# Patient Record
Sex: FEMALE | Race: WHITE | NOT HISPANIC OR LATINO | URBAN - METROPOLITAN AREA
[De-identification: names, ages, dates, MRNs, and addresses within clinical notes are randomized per-mention and may not be internally consistent; named-entity substitution may affect disease eponyms.]

---

## 2023-05-17 ENCOUNTER — APPOINTMENT (RX ONLY)
Dept: URBAN - METROPOLITAN AREA CLINIC 76 | Facility: CLINIC | Age: 58
Setting detail: DERMATOLOGY
End: 2023-05-17

## 2023-05-17 DIAGNOSIS — L81.4 OTHER MELANIN HYPERPIGMENTATION: ICD-10-CM

## 2023-05-17 DIAGNOSIS — Z71.89 OTHER SPECIFIED COUNSELING: ICD-10-CM

## 2023-05-17 DIAGNOSIS — L30.9 DERMATITIS, UNSPECIFIED: ICD-10-CM

## 2023-05-17 DIAGNOSIS — D22 MELANOCYTIC NEVI: ICD-10-CM

## 2023-05-17 DIAGNOSIS — L57.0 ACTINIC KERATOSIS: ICD-10-CM

## 2023-05-17 DIAGNOSIS — D18.0 HEMANGIOMA: ICD-10-CM

## 2023-05-17 DIAGNOSIS — L82.1 OTHER SEBORRHEIC KERATOSIS: ICD-10-CM

## 2023-05-17 PROBLEM — D22.5 MELANOCYTIC NEVI OF TRUNK: Status: ACTIVE | Noted: 2023-05-17

## 2023-05-17 PROBLEM — D18.01 HEMANGIOMA OF SKIN AND SUBCUTANEOUS TISSUE: Status: ACTIVE | Noted: 2023-05-17

## 2023-05-17 PROCEDURE — ? TREATMENT REGIMEN

## 2023-05-17 PROCEDURE — ? SUNSCREEN RECOMMENDATIONS

## 2023-05-17 PROCEDURE — ? PRESCRIPTION

## 2023-05-17 PROCEDURE — 17000 DESTRUCT PREMALG LESION: CPT

## 2023-05-17 PROCEDURE — ? LIQUID NITROGEN

## 2023-05-17 PROCEDURE — 17003 DESTRUCT PREMALG LES 2-14: CPT

## 2023-05-17 PROCEDURE — ? COUNSELING

## 2023-05-17 PROCEDURE — 99203 OFFICE O/P NEW LOW 30 MIN: CPT | Mod: 25

## 2023-05-17 RX ORDER — FLUTICASONE PROPIONATE 0.5 MG/G
CREAM TOPICAL
Qty: 30 | Refills: 0 | Status: ERX | COMMUNITY
Start: 2023-05-17

## 2023-05-17 RX ADMIN — FLUTICASONE PROPIONATE: 0.5 CREAM TOPICAL at 00:00

## 2023-05-17 ASSESSMENT — LOCATION DETAILED DESCRIPTION DERM
LOCATION DETAILED: SUBXIPHOID
LOCATION DETAILED: LEFT FOREHEAD
LOCATION DETAILED: NASAL DORSUM
LOCATION DETAILED: INFERIOR THORACIC SPINE

## 2023-05-17 ASSESSMENT — LOCATION SIMPLE DESCRIPTION DERM
LOCATION SIMPLE: ABDOMEN
LOCATION SIMPLE: UPPER BACK
LOCATION SIMPLE: NOSE
LOCATION SIMPLE: LEFT FOREHEAD

## 2023-05-17 ASSESSMENT — LOCATION ZONE DERM
LOCATION ZONE: TRUNK
LOCATION ZONE: FACE
LOCATION ZONE: NOSE

## 2023-05-17 NOTE — PROCEDURE: TREATMENT REGIMEN
Plan: Advised to keep area as cool and dry as possible
Detail Level: Zone
Initiate Treatment: Fluticasone cream to affected areas twice daily x 1 month

## 2023-05-17 NOTE — HPI: TOTAL BODY SKIN EXAM (PATIENT REPORTED)
Do You Have Any Concerning Skin Lesions Today?  If No, You Do Not Have To Fill Out The Remainder Of The Questions.: yes
Where Is Your Skin Lesion(S) Of Concern Located?: Nose

## 2023-05-17 NOTE — PROCEDURE: LIQUID NITROGEN
Show Applicator Variable?: Yes
Duration Of Freeze Thaw-Cycle (Seconds): 10
Post-Care Instructions: I reviewed with the patient in detail post-care instructions. Patient is to wear sunprotection, and avoid picking at any of the treated lesions. Pt may apply Vaseline to crusted or scabbing areas.
Consent: The patient's consent was obtained including but not limited to risks of crusting, scabbing, blistering, scarring, darker or lighter pigmentary change, recurrence, incomplete removal and infection.
Render Note In Bullet Format When Appropriate: No
Detail Level: Detailed
Number Of Freeze-Thaw Cycles: 2 freeze-thaw cycles

## 2023-09-18 ENCOUNTER — APPOINTMENT (RX ONLY)
Dept: URBAN - METROPOLITAN AREA CLINIC 76 | Facility: CLINIC | Age: 58
Setting detail: DERMATOLOGY
End: 2023-09-18

## 2023-09-18 DIAGNOSIS — L57.0 ACTINIC KERATOSIS: ICD-10-CM

## 2023-09-18 DIAGNOSIS — L30.1 DYSHIDROSIS [POMPHOLYX]: ICD-10-CM

## 2023-09-18 PROCEDURE — ? PRESCRIPTION

## 2023-09-18 PROCEDURE — ? TREATMENT REGIMEN

## 2023-09-18 PROCEDURE — 99213 OFFICE O/P EST LOW 20 MIN: CPT

## 2023-09-18 PROCEDURE — ? COUNSELING

## 2023-09-18 RX ORDER — CLOBETASOL PROPIONATE 0.5 MG/G
CREAM TOPICAL BID
Qty: 30 | Refills: 0 | Status: ERX | COMMUNITY
Start: 2023-09-18

## 2023-09-18 RX ADMIN — CLOBETASOL PROPIONATE: 0.5 CREAM TOPICAL at 00:00

## 2023-09-18 ASSESSMENT — LOCATION ZONE DERM: LOCATION ZONE: HAND

## 2023-09-18 ASSESSMENT — LOCATION SIMPLE DESCRIPTION DERM
LOCATION SIMPLE: LEFT HAND
LOCATION SIMPLE: RIGHT HAND

## 2023-09-18 ASSESSMENT — LOCATION DETAILED DESCRIPTION DERM
LOCATION DETAILED: RIGHT RADIAL PALM
LOCATION DETAILED: LEFT RADIAL PALM

## 2023-09-18 NOTE — PROCEDURE: TREATMENT REGIMEN
Plan: - discussed doing wet dressings at night for 20-30mins\\n- discussed patch testing as an option\\n- if no change at follow up, recommend systemic steroids
Detail Level: Zone
Initiate Treatment: - clobetasol twice daily to affected areas up to month. Once clear taper off use.
Discontinue Regimen: - recommended to stop getting manicures

## 2023-09-18 NOTE — HPI: RASH
What Type Of Note Output Would You Prefer (Optional)?: Standard Output
Is The Patient Presenting As Previously Scheduled?: Yes
How Severe Is Your Rash?: mild
Is This A New Presentation, Or A Follow-Up?: Rash
Additional History: Patient said she went to a walk in clinic and to her PCP. She was told she has eczema and was given TAC. She said it’s not helping.

## 2023-09-29 ENCOUNTER — RX ONLY (OUTPATIENT)
Age: 58
Setting detail: RX ONLY
End: 2023-09-29

## 2023-09-29 RX ORDER — CLOBETASOL PROPIONATE 0.5 MG/G
OINTMENT TOPICAL BID
Qty: 30 | Refills: 0 | Status: ERX | COMMUNITY
Start: 2023-09-29

## 2023-10-17 ENCOUNTER — APPOINTMENT (RX ONLY)
Dept: URBAN - METROPOLITAN AREA CLINIC 76 | Facility: CLINIC | Age: 58
Setting detail: DERMATOLOGY
End: 2023-10-17

## 2023-10-17 DIAGNOSIS — L30.1 DYSHIDROSIS [POMPHOLYX]: ICD-10-CM

## 2023-10-17 PROBLEM — L30.9 DERMATITIS, UNSPECIFIED: Status: ACTIVE | Noted: 2023-10-17

## 2023-10-17 PROCEDURE — ? TREATMENT REGIMEN

## 2023-10-17 PROCEDURE — ? COUNSELING

## 2023-10-17 PROCEDURE — 99213 OFFICE O/P EST LOW 20 MIN: CPT

## 2023-10-17 ASSESSMENT — LOCATION SIMPLE DESCRIPTION DERM
LOCATION SIMPLE: LEFT HAND
LOCATION SIMPLE: RIGHT HAND

## 2023-10-17 ASSESSMENT — LOCATION DETAILED DESCRIPTION DERM
LOCATION DETAILED: RIGHT RADIAL PALM
LOCATION DETAILED: LEFT RADIAL PALM

## 2023-10-17 ASSESSMENT — LOCATION ZONE DERM: LOCATION ZONE: HAND

## 2023-10-17 NOTE — PROCEDURE: TREATMENT REGIMEN
Continue Regimen: clobetasol ointment twice daily to affected areas up to month. Once clear taper off use.
Plan: discussed doing wet dressings at night for 20-30mins\\n\\ndiscussed patch testing as an option, we will submit IOB
Detail Level: Zone

## 2024-04-16 ENCOUNTER — APPOINTMENT (RX ONLY)
Dept: URBAN - METROPOLITAN AREA CLINIC 76 | Facility: CLINIC | Age: 59
Setting detail: DERMATOLOGY
End: 2024-04-16

## 2024-04-16 DIAGNOSIS — L30.1 DYSHIDROSIS [POMPHOLYX]: ICD-10-CM | Status: INADEQUATELY CONTROLLED

## 2024-04-16 PROBLEM — L30.9 DERMATITIS, UNSPECIFIED: Status: ACTIVE | Noted: 2024-04-16

## 2024-04-16 PROCEDURE — 99214 OFFICE O/P EST MOD 30 MIN: CPT

## 2024-04-16 PROCEDURE — ? PRESCRIPTION

## 2024-04-16 PROCEDURE — ? TREATMENT REGIMEN

## 2024-04-16 PROCEDURE — ? COUNSELING

## 2024-04-16 RX ORDER — BETAMETHASONE DIPROPIONATE 0.5 MG/G
CREAM TOPICAL BID
Qty: 45 | Refills: 0 | Status: ERX | COMMUNITY
Start: 2024-04-16

## 2024-04-16 RX ADMIN — BETAMETHASONE DIPROPIONATE: 0.5 CREAM TOPICAL at 00:00

## 2024-04-16 ASSESSMENT — LOCATION DETAILED DESCRIPTION DERM
LOCATION DETAILED: LEFT RADIAL PALM
LOCATION DETAILED: RIGHT RADIAL PALM

## 2024-04-16 ASSESSMENT — LOCATION ZONE DERM: LOCATION ZONE: HAND

## 2024-04-16 ASSESSMENT — LOCATION SIMPLE DESCRIPTION DERM
LOCATION SIMPLE: RIGHT HAND
LOCATION SIMPLE: LEFT HAND

## 2024-04-16 NOTE — PROCEDURE: TREATMENT REGIMEN
Otc Regimen: Hydrocortisone cream twice daily to areas around eyes if flare occurs again \\n\\nAquaphor at night after steroid
Plan: Discussed patch testing, patient will think about it and let our office know if she would like us to submit a new IOB \\n\\npatient states she does have seasonal allergies
Detail Level: Zone
Initiate Treatment: Betamethasone cream to affected areas on hands twice daily until clear, avoid face, armpits or groin
Discontinue Regimen: clobetasol ointment

## 2024-04-16 NOTE — HPI: RASH
What Type Of Note Output Would You Prefer (Optional)?: Standard Output
How Severe Is Your Rash?: mild
Is This A New Presentation, Or A Follow-Up?: Rash
Additional History: Patient states that over the weekend she got some allergy eye drops and rubbed it on her eyes and then it started to get better

## 2024-10-01 ENCOUNTER — RX ONLY (OUTPATIENT)
Age: 59
Setting detail: RX ONLY
End: 2024-10-01

## 2024-10-01 ENCOUNTER — APPOINTMENT (RX ONLY)
Dept: URBAN - METROPOLITAN AREA CLINIC 76 | Facility: CLINIC | Age: 59
Setting detail: DERMATOLOGY
End: 2024-10-01

## 2024-10-01 DIAGNOSIS — L81.4 OTHER MELANIN HYPERPIGMENTATION: ICD-10-CM

## 2024-10-01 DIAGNOSIS — L82.1 OTHER SEBORRHEIC KERATOSIS: ICD-10-CM

## 2024-10-01 DIAGNOSIS — L30.1 DYSHIDROSIS [POMPHOLYX]: ICD-10-CM

## 2024-10-01 DIAGNOSIS — D22 MELANOCYTIC NEVI: ICD-10-CM

## 2024-10-01 DIAGNOSIS — D18.0 HEMANGIOMA: ICD-10-CM

## 2024-10-01 DIAGNOSIS — Z71.89 OTHER SPECIFIED COUNSELING: ICD-10-CM

## 2024-10-01 PROBLEM — L30.9 DERMATITIS, UNSPECIFIED: Status: ACTIVE | Noted: 2024-10-01

## 2024-10-01 PROBLEM — D18.01 HEMANGIOMA OF SKIN AND SUBCUTANEOUS TISSUE: Status: ACTIVE | Noted: 2024-10-01

## 2024-10-01 PROBLEM — D22.5 MELANOCYTIC NEVI OF TRUNK: Status: ACTIVE | Noted: 2024-10-01

## 2024-10-01 PROCEDURE — ? COUNSELING

## 2024-10-01 PROCEDURE — ? SUNSCREEN RECOMMENDATIONS

## 2024-10-01 PROCEDURE — ? PRESCRIPTION

## 2024-10-01 PROCEDURE — 99214 OFFICE O/P EST MOD 30 MIN: CPT

## 2024-10-01 PROCEDURE — ? TREATMENT REGIMEN

## 2024-10-01 RX ORDER — HYDROCORTISONE 25 MG/G
CREAM TOPICAL
Qty: 28 | Refills: 0 | Status: ERX | COMMUNITY
Start: 2024-10-01

## 2024-10-01 RX ORDER — BETAMETHASONE DIPROPIONATE 0.5 MG/G
CREAM TOPICAL BID
Qty: 45 | Refills: 0 | Status: ERX | COMMUNITY
Start: 2024-10-01

## 2024-10-01 RX ADMIN — HYDROCORTISONE: 25 CREAM TOPICAL at 00:00

## 2024-10-01 ASSESSMENT — LOCATION DETAILED DESCRIPTION DERM
LOCATION DETAILED: RIGHT RADIAL PALM
LOCATION DETAILED: LEFT RADIAL PALM
LOCATION DETAILED: RIGHT MEDIAL UPPER BACK
LOCATION DETAILED: INFERIOR THORACIC SPINE
LOCATION DETAILED: RIGHT SUPERIOR UPPER BACK

## 2024-10-01 ASSESSMENT — LOCATION SIMPLE DESCRIPTION DERM
LOCATION SIMPLE: UPPER BACK
LOCATION SIMPLE: RIGHT HAND
LOCATION SIMPLE: RIGHT UPPER BACK
LOCATION SIMPLE: LEFT HAND

## 2024-10-01 ASSESSMENT — LOCATION ZONE DERM
LOCATION ZONE: HAND
LOCATION ZONE: TRUNK

## 2024-10-01 NOTE — PROCEDURE: MIPS QUALITY
Quality 226: Preventive Care And Screening: Tobacco Use: Screening And Cessation Intervention: Patient screened for tobacco use and is an ex/non-smoker
Detail Level: Detailed
Self

## 2024-10-01 NOTE — PROCEDURE: TREATMENT REGIMEN
Otc Regimen: Hydrocortisone cream twice daily to areas around eyes if flare occurs again \\n\\nAquaphor at night after steroid
Continue Regimen: Betamethasone cream to affected areas on hands twice daily until clear, avoid face, armpits or groin
Plan: Discussed patch testing, patient will think about it and let our office know if she would like us to submit a new IOB \\n\\npatient states she does have seasonal allergies
Detail Level: Zone
Initiate Treatment: Hydrocortisone 2.5% cream apply to rash on eyes twice daily x1-2 weeks as needed
Discontinue Regimen: clobetasol ointment